# Patient Record
Sex: FEMALE | Race: WHITE | NOT HISPANIC OR LATINO | Employment: FULL TIME | URBAN - METROPOLITAN AREA
[De-identification: names, ages, dates, MRNs, and addresses within clinical notes are randomized per-mention and may not be internally consistent; named-entity substitution may affect disease eponyms.]

---

## 2023-05-15 ENCOUNTER — TELEPHONE (OUTPATIENT)
Dept: PRIMARY CARE | Facility: CLINIC | Age: 68
End: 2023-05-15

## 2023-07-31 ENCOUNTER — TELEPHONE (OUTPATIENT)
Dept: PRIMARY CARE | Facility: CLINIC | Age: 68
End: 2023-07-31

## 2023-07-31 DIAGNOSIS — Z11.59 SCREENING EXAMINATION FOR RUBELLA: Primary | ICD-10-CM

## 2023-08-01 ENCOUNTER — LAB (OUTPATIENT)
Dept: LAB | Facility: LAB | Age: 68
End: 2023-08-01
Payer: MEDICARE

## 2023-08-01 DIAGNOSIS — Z11.59 SCREENING EXAMINATION FOR RUBELLA: ICD-10-CM

## 2023-08-01 LAB
MUMPS IGG ANTIBODY: POSITIVE
RUBELLA VIRUS IGG AB: POSITIVE
RUBEOLA IGG ANTIBODY: POSITIVE

## 2023-08-01 PROCEDURE — 86735 MUMPS ANTIBODY: CPT

## 2023-08-01 PROCEDURE — 86762 RUBELLA ANTIBODY: CPT

## 2023-08-01 PROCEDURE — 36415 COLL VENOUS BLD VENIPUNCTURE: CPT

## 2023-08-01 PROCEDURE — 86765 RUBEOLA ANTIBODY: CPT

## 2024-05-20 ENCOUNTER — APPOINTMENT (OUTPATIENT)
Dept: RADIOLOGY | Facility: HOSPITAL | Age: 69
End: 2024-05-20
Payer: COMMERCIAL

## 2024-05-20 ENCOUNTER — HOSPITAL ENCOUNTER (EMERGENCY)
Facility: HOSPITAL | Age: 69
Discharge: HOME | End: 2024-05-20
Payer: COMMERCIAL

## 2024-05-20 VITALS
BODY MASS INDEX: 28.72 KG/M2 | WEIGHT: 157 LBS | RESPIRATION RATE: 16 BRPM | TEMPERATURE: 97.8 F | OXYGEN SATURATION: 98 % | SYSTOLIC BLOOD PRESSURE: 141 MMHG | HEART RATE: 74 BPM | DIASTOLIC BLOOD PRESSURE: 79 MMHG
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
## 2024-05-20 DIAGNOSIS — M25.561 ACUTE PAIN OF RIGHT KNEE: Primary | ICD-10-CM
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
## 2024-05-20 PROBLEM — M16.11 PRIMARY LOCALIZED OSTEOARTHRITIS OF RIGHT HIP: Status: ACTIVE | Noted: 2024-05-20

## 2024-05-20 PROBLEM — M54.12 CERVICAL RADICULOPATHY: Status: ACTIVE | Noted: 2024-05-20

## 2024-05-20 PROBLEM — E34.8 PINEAL GLAND CYST: Status: ACTIVE | Noted: 2024-05-20

## 2024-05-20 PROBLEM — E78.5 HYPERLIPIDEMIA: Status: ACTIVE | Noted: 2024-05-20

## 2024-05-20 PROBLEM — E55.9 VITAMIN D DEFICIENCY: Status: ACTIVE | Noted: 2024-05-20

## 2024-05-20 PROBLEM — R25.1 TREMOR: Status: ACTIVE | Noted: 2024-05-20

## 2024-05-20 PROBLEM — M85.80 OSTEOPENIA: Status: ACTIVE | Noted: 2024-05-20

## 2024-05-20 PROCEDURE — 99283 EMERGENCY DEPT VISIT LOW MDM: CPT

## 2024-05-20 PROCEDURE — 73564 X-RAY EXAM KNEE 4 OR MORE: CPT | Mod: RT

## 2024-05-20 PROCEDURE — 73564 X-RAY EXAM KNEE 4 OR MORE: CPT | Mod: RIGHT SIDE | Performed by: RADIOLOGY

## 2024-05-20 ASSESSMENT — PAIN SCALES - GENERAL: PAINLEVEL_OUTOF10: 5 - MODERATE PAIN

## 2024-05-20 ASSESSMENT — PAIN - FUNCTIONAL ASSESSMENT: PAIN_FUNCTIONAL_ASSESSMENT: 0-10

## 2024-05-20 ASSESSMENT — COLUMBIA-SUICIDE SEVERITY RATING SCALE - C-SSRS
2. HAVE YOU ACTUALLY HAD ANY THOUGHTS OF KILLING YOURSELF?: NO
6. HAVE YOU EVER DONE ANYTHING, STARTED TO DO ANYTHING, OR PREPARED TO DO ANYTHING TO END YOUR LIFE?: NO
1. IN THE PAST MONTH, HAVE YOU WISHED YOU WERE DEAD OR WISHED YOU COULD GO TO SLEEP AND NOT WAKE UP?: NO

## 2024-05-20 NOTE — DISCHARGE INSTRUCTIONS
Please continue RICE therapy: Rest, ice, compression and elevation.  Continue Tylenol and/or ibuprofen as needed for pain.  Follow-up with occupational health and/or orthopedics.  Return to ER for any new or worsening symptoms.     orthopedic injury clinic   [facility] sports medicine [place]  [address]  [address]  [place] [address]  [phone]  Open for walk-ins Monday through Friday 8:30 AM through 4 PM  open 10 AM to 2 PM Christmas eve and New Year's yury  Closed on [place] day and New Year's day

## 2024-05-20 NOTE — Clinical Note
Estrella Aguilar was seen and treated in our emergency department on 5/20/2024.  She may return to work on 05/22/2024.       If you have any questions or concerns, please don't hesitate to call.      Rosa Gonzalez PA-C

## 2024-05-20 NOTE — ED PROVIDER NOTES
Chief Complaint   Patient presents with    Knee Pain     HPI:   Estrella Aguilar is an 69 y.o. female with history of HLD, vitamin disorder that presents to the ED for evaluation of right knee pain.  Patient says she was at work on Friday.  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  One of them off the floor and did not put out a wet floor sign.  She had a young autistic patient who was running out of the room.  She ran after him and as soon as she did she fell on the wet floor and landed on her right knee.  She did not hit her head.  Denies headache, loss of consciousness, head pain, neck pain.  Localizes pain to the lateral aspect of her right knee.  Currently pain is 4/10.  When she walks pain becomes 2-3/10.  Has not taken any medication for pain today.  Took Aleve yesterday.  Endorses tingling but denies any numbness, loss of sensation, muscle weakness, wrist pain, neck pain, back pain.  Is not on anticoagulants.    Medications:  Soc HX: Denies substance use  Allergies   Allergen Reactions    Amoxicillin-Pot Clavulanate GI Upset     diarrhea    Sulfa (Sulfonamide Antibiotics) Hives   :  Past Medical History:   Diagnosis Date    Nondisplaced fracture of left ulna styloid process, initial encounter for closed fracture 11/04/2022    Closed nondisplaced fracture of styloid process of left ulna, initial encounter    Nondisplaced fracture of right ulna styloid process, subsequent encounter for closed fracture with routine healing 11/09/2022    Closed nondisplaced fracture of styloid process of right ulna with routine healing, subsequent encounter    Other displaced fracture of upper end of left humerus, subsequent encounter for fracture with routine healing 02/02/2022    Other closed displaced fracture of proximal end of left humerus with routine healing, subsequent encounter    Other extraarticular fracture of lower end of left radius, initial encounter for closed  fracture 11/04/2022    Other closed extra-articular fracture of distal end of left radius, initial encounter    Other fracture of upper and lower end of left fibula, initial encounter for closed fracture 02/02/2022    Fracture of fibula, distal, left, closed    Other specified joint disorders, right hip 02/02/2022    Femoroacetabular impingement of right hip    Pathological fracture, unspecified femur, initial encounter for fracture (Multi) 05/09/2014    Subchondral insufficiency fracture of femoral condyle    Stress fracture, unspecified femur, initial encounter for fracture 05/09/2014    Stress fracture of femur    Unspecified fracture of the lower end of right radius, initial encounter for closed fracture 11/09/2022    Distal radius fracture, right    Unspecified perforation of tympanic membrane, left ear 10/05/2022    Tympanic membrane perforation, left     Past Surgical History:   Procedure Laterality Date    BREAST SURGERY  05/06/2014    Breast Surgery Reduction Procedure    OTHER SURGICAL HISTORY  05/06/2014    Tympanic Membrane Repair - Left Ear    OTHER SURGICAL HISTORY  05/06/2014    Craniotomy Supratentorial Excision Of Cyst    TOTAL ABDOMINAL HYSTERECTOMY  05/06/2014    Total Abdominal Hysterectomy     No family history on file.     Physical Exam  Vitals and nursing note reviewed.   Constitutional:       General: She is not in acute distress.     Appearance: Normal appearance. She is not ill-appearing or toxic-appearing.      Comments: Pleasant   HENT:      Head: Normocephalic and atraumatic.      Comments: No signs basilar skull fracture     Right Ear: External ear normal.      Left Ear: External ear normal.   Eyes:      Pupils: Pupils are equal, round, and reactive to light.   Cardiovascular:      Rate and Rhythm: Normal rate and regular rhythm.      Pulses: Normal pulses.      Heart sounds: Normal heart sounds.   Pulmonary:      Effort: Pulmonary effort is normal.      Breath sounds: Normal breath  sounds.   Musculoskeletal:         General: Normal range of motion.      Comments: RLE: No point tenderness of the hip.  Normal ROM of the knee and ankle.  Tenderness with palpation along the lateral aspect of the knee without warmth, erythema, ecchymosis nor edema.  No bony crepitus.  No varus or valgus instability.  Negative anterior drawer.  2+ DP and PT pulse.  5/5 strength.   Skin:     General: Skin is warm and dry.      Capillary Refill: Capillary refill takes less than 2 seconds.   Neurological:      Mental Status: She is alert.      Cranial Nerves: No cranial nerve deficit.      Sensory: No sensory deficit.      Deep Tendon Reflexes: Reflexes normal.     VS: As documented in the triage note and EMR flowsheet from this visit were reviewed.    External Records Reviewed: I reviewed recent and relevant outside records including: Seen by PCP 5/10/2024 for MMR vaccine.  Tetanus up-to-date.      Medical Decision Making:   ED Course as of 05/20/24 1321   Mon May 20, 2024   0743 Vitals Reviewed: Hypertensive. Not tachycardic nor tachypneic. No hypoxia.   [KA]   0812 Patient is 69-year-old female who presents to the ED for evaluation following a fall that occurred 4 days ago.  On exam she has tenderness with palpation of the lateral aspect of the right knee without any erythema, ecchymosis, edema, warmth.  She has normal range of motion without bony crepitus.  No obvious deformity.  Will obtain x-ray imaging.  No concern for septic arthritis that would necessitate lab work.  Patient does not want any medication for pain. [KA]   0838 I personally viewed x-ray imaging and see no evidence of fracture nor dislocation.  Radiology concurs. [KA]   0921 Patient given copy of completed FROI form. [KA]   0922 Advised RICE therapy, NSAIDs and Tylenol.  Follow-up with orthopedics and/or occupational health.  Return to ER for any new or worsening symptoms.  Work note provided. [KA]      ED Course User Index  [KA] Rosa Gonzalez,  SANDI         Diagnoses as of 05/20/24 1321   Acute pain of right knee   Fall due to wet surface, initial encounter   Work related injury      Escalation of Care: Appropriate for outpatient management     Counseling: Spoke with the patient and discussed today´s findings, in addition to providing specific details for the plan of care and expected course.  Patient was given the opportunity to ask questions.    Discussed return precautions and importance of follow-up.  Advised to follow-up with occupational health and Ortho.  Advised to return to the ED for changing or worsening symptoms, new symptoms, complaint specific precautions, and precautions listed on the discharge paperwork.  Educated on the common potential side effects of medications prescribed.    I advised the patient that the emergency evaluation and treatment provided today doesn't end their need for medical care. It is very important that they follow-up with their primary care provider or other specialist as instructed.    The plan of care was mutually agreed upon with the patient. The patient and/or family were given the opportunity to ask questions. All questions asked today in the ED were answered to the best of my ability with today's information.    I specifically advised the patient to return to the ED for changing or worsening symptoms, worrisome new symptoms, or for any complaint specific precautions listed on the discharge paperwork.    This patient was cared for in the setting of nationwide stress on resources and staffing.    This report was transcribed using voice recognition software.  Every effort was made to ensure accuracy, however, inadvertently computerized transcription errors may be present.       Rosa Gonzalez PA-C  05/20/24 1321

## 2024-05-20 NOTE — ED TRIAGE NOTES
PT TO ED WITH C/O RIGHT KNEE PAIN. PT SLIPPED ON WET FLOOR AT WORK AND FELL ONTO RIGHT KNEE. DENIES HITTING HEAD, LOC. PT AMBULATED TO TRIAGE. PT ENDORSES MILD NUMBNESS/TINGLING TO SIDE OF KNEE

## 2024-05-21 ENCOUNTER — APPOINTMENT (OUTPATIENT)
Dept: PRIMARY CARE | Facility: CLINIC | Age: 69
End: 2024-05-21
Payer: MEDICARE

## 2024-06-05 ENCOUNTER — APPOINTMENT (OUTPATIENT)
Dept: PRIMARY CARE | Facility: CLINIC | Age: 69
End: 2024-06-05
Payer: MEDICARE

## 2024-08-20 ENCOUNTER — APPOINTMENT (OUTPATIENT)
Dept: ORTHOPEDIC SURGERY | Facility: CLINIC | Age: 69
End: 2024-08-20
Payer: MEDICARE

## 2024-09-03 ENCOUNTER — HOSPITAL ENCOUNTER (OUTPATIENT)
Dept: RADIOLOGY | Facility: CLINIC | Age: 69
Discharge: HOME | End: 2024-09-03
Payer: MEDICARE

## 2024-09-03 ENCOUNTER — OFFICE VISIT (OUTPATIENT)
Dept: ORTHOPEDIC SURGERY | Facility: CLINIC | Age: 69
End: 2024-09-03
Payer: MEDICARE

## 2024-09-03 DIAGNOSIS — M16.11 PRIMARY LOCALIZED OSTEOARTHRITIS OF RIGHT HIP: Primary | ICD-10-CM

## 2024-09-03 DIAGNOSIS — M16.11 PRIMARY LOCALIZED OSTEOARTHRITIS OF RIGHT HIP: ICD-10-CM

## 2024-09-03 PROCEDURE — 99213 OFFICE O/P EST LOW 20 MIN: CPT

## 2024-09-03 PROCEDURE — 73502 X-RAY EXAM HIP UNI 2-3 VIEWS: CPT | Mod: RIGHT SIDE | Performed by: RADIOLOGY

## 2024-09-03 PROCEDURE — 73502 X-RAY EXAM HIP UNI 2-3 VIEWS: CPT | Mod: RT

## 2024-09-03 NOTE — LETTER
September 3, 2024     Patient: Estrella Aguilar   YOB: 1955   Date of Visit: 9/3/2024       To Whom It May Concern:    It is my medical opinion that Estrella Aguilar may return to light duty immediately with the following restrictions: cannot bend .    If you have any questions or concerns, please don't hesitate to call.         Sincerely,        Shelley Garrison PA-C    CC: No Recipients

## 2024-09-03 NOTE — PROGRESS NOTES
ROBERTO Bunch, PAMildredC  Division of Adult Reconstruction  Phone: [phone]  Fax: [phone]    PRIMARY CARE PHYSICIAN: Suzie Jett MD  REFERRING PROVIDER: No referring provider defined for this encounter.     VIRAJ Aguilar is a pleasant 69 y.o. year-old female who is seen today for follow up evaluation of right hip pain and known right sided OA. She was last seen in office 8/8/23 where she was pleased with the overall state of her hip. She was having mild pain and it was not impacting her quality of life. Today, she presents with occasional increased stiffness and pain of the right hip. She states the pain fluctuates day to day with some days having no pain at all. Overall the pain is manageable with OTC medications and she is able to perform her daily activities without any issue. They do not associate with a traumatic injury. The patient states that the pain is located in the lateral, over greater trochanter, groin. The patient is now doing martial arts. The patient's pain and symptoms are worse with activity or getting up from the ground. The patient has tried activity modification, over the counter medications, and weight loss with sufficient relief of symptoms. The patient continues to be happy with her physical abilities and does not feel her right hip OA is impacting her quality of life. The patient denies any numbness or tingling of the side: right lower extremity.    Functional status: occasionally limited  Instability: No  Symptoms impacting sleep: No  Impacting quality of life: No  Pain level: 4  Back pain: No  Radicular symptoms: kwside: No    Review of systems  There has been no interval change in this patient's past medical, surgical, medications, allergies, family history or social history since the most recent visit to a provider within our department. Adult patient history sheet was filled out by the patient today in clinic and will be scanned into the EMR. I personally  reviewed this form which will be scanned into the EMR. 14 point review of systems was performed, reviewed, and negative except for pertinent positives documented in the history of present illness.       Review of Systems  Past Medical History:   Diagnosis Date    Nondisplaced fracture of left ulna styloid process, initial encounter for closed fracture 11/04/2022    Closed nondisplaced fracture of styloid process of left ulna, initial encounter    Nondisplaced fracture of right ulna styloid process, subsequent encounter for closed fracture with routine healing 11/09/2022    Closed nondisplaced fracture of styloid process of right ulna with routine healing, subsequent encounter    Other displaced fracture of upper end of left humerus, subsequent encounter for fracture with routine healing 02/02/2022    Other closed displaced fracture of proximal end of left humerus with routine healing, subsequent encounter    Other extraarticular fracture of lower end of left radius, initial encounter for closed fracture 11/04/2022    Other closed extra-articular fracture of distal end of left radius, initial encounter    Other fracture of upper and lower end of left fibula, initial encounter for closed fracture 02/02/2022    Fracture of fibula, distal, left, closed    Other specified joint disorders, right hip 02/02/2022    Femoroacetabular impingement of right hip    Pathological fracture, unspecified femur, initial encounter for fracture (Multi) 05/09/2014    Subchondral insufficiency fracture of femoral condyle    Stress fracture, unspecified femur, initial encounter for fracture 05/09/2014    Stress fracture of femur    Unspecified fracture of the lower end of right radius, initial encounter for closed fracture 11/09/2022    Distal radius fracture, right    Unspecified perforation of tympanic membrane, left ear 10/05/2022    Tympanic membrane perforation, left     Patient Active Problem List   Diagnosis    Brachial neuritis or  radiculitis    Cervical radiculopathy    Cervical spondylosis without myelopathy    Cervicalgia    Hyperlipidemia    Hypertrophy of breast    Osteopenia    Pineal gland cyst    Primary localized osteoarthritis of right hip    Tremor    Vitamin D deficiency     Medication Documentation Review Audit       Reviewed by Rosa Gonzalez PA-C (Physician Assistant) on 05/20/24 at 0743      Medication Order Taking? Sig Documenting Provider Last Dose Status            No Medications to Display                                 Allergies   Allergen Reactions    Amoxicillin-Pot Clavulanate GI Upset     diarrhea    Sulfa (Sulfonamide Antibiotics) Hives     Social History     Socioeconomic History    Marital status:      Spouse name: Not on file    Number of children: Not on file    Years of education: Not on file    Highest education level: Not on file   Occupational History    Not on file   Tobacco Use    Smoking status: Not on file    Smokeless tobacco: Not on file   Substance and Sexual Activity    Alcohol use: Not on file    Drug use: Not on file    Sexual activity: Not on file   Other Topics Concern    Not on file   Social History Narrative    Not on file     Social Determinants of Health     Financial Resource Strain: Not on file   Food Insecurity: Not on file   Transportation Needs: Not on file   Physical Activity: Not on file   Stress: Not on file   Social Connections: Not on file   Intimate Partner Violence: Not on file   Housing Stability: Not on file     Past Surgical History:   Procedure Laterality Date    BREAST SURGERY  05/06/2014    Breast Surgery Reduction Procedure    OTHER SURGICAL HISTORY  05/06/2014    Tympanic Membrane Repair - Left Ear    OTHER SURGICAL HISTORY  05/06/2014    Craniotomy Supratentorial Excision Of Cyst    TOTAL ABDOMINAL HYSTERECTOMY  05/06/2014    Total Abdominal Hysterectomy       Physical Exam  side: right Hip:  General: Well-appearing female in no acute distress.  Awake, alert  and oriented.  Pleasant and cooperative.  Respiratory: Non-labored breathing  Mood: Euthymic   Gait: Mild antalgia  Assistive Device: no device  Skin: warm, dry and intact without lesions    Range of Motion affected side: right hip:  Flexion: 100  Extension: 0  Internal rotation: 10  External rotation: 25  Abduction: 25  Hip Flexor Strength: 5/5   Abductor Strength: 5/5  Adductor Strength: 5/5  Tenderness: Lateral and Groin  Sensation: Intact to light touch distally  Motor function: Able to fire TA, EHL, G/S  Pulses: Palpable DP pulse    Range of Motion unaffected side: left hip:  Flexion:  120  Extension: 0  Internal rotation:  20  External rotation:  40  abduction:  35  Hip Flexor Strength: 5/5   Abductor Strength: 5/5  Adductor Strength: 5/5  Tenderness: None  Sensation: Intact to light touch distally  Motor function: Able to fire TA, EHL, G/S  Pulses: Palpable DP pulse      Imaging:   3 view Hip and Pelvis were independently reviewed and interpreted in clinic today. The findings were reviewed with the patient. There are Moderate to severe degenerative changes of the right hip with associated joint space narrowing, subchondral sclerosis, and osteophyte formation. No evidence of fracture, AVN, dislocation, osteomyelitis.    Impression/Plan:  Estrella Aguilar and I discussed the etiology of symptoms.  We discussed in detail a treatment plan that she is comfortable with.    Moderate to severe Osteoarthritis side: right Hip. We reviewed an evidence-based approach to osteoarthritis of the hip.    I advised that the patient that they can manage their pain symptomatically, with 3-5 day courses of nonsteroidal anti-inflammatories discussed their risks and need for regular monitoring for side effects of these medications. I suggested activity modification as needed when there is a a flare up. I explained to them that the best interventions she can take to perhaps slow the progression of the degenerative changes in the  long-term are maintaining a healthy weight and avoiding joint loading activities and perform low impact exercise such as cycling, walking, and swimming. The use of a walking stick, cane or other assistive device can help as well. Corticosteroids play a role to temporarily aid in pain relief as an additional option and discussed associated risks. A referral to sports medicine will need to be made, if the patient elects to undergo an ultrasound guided hip injection. Should these measures fail, the most invasive option would be joint replacement surgery. The patient should try and delay joint replacement surgery for as long as possible. If the patients' joint problem affects their quality of life and cause significant restrictions of their activities, they may want to consider joint replacement surgery. I briefly covered the risks, benefits and expected recovery after total joint arthroplasty.    Estrella Aguilar may one day benefit from an elective total joint replacement however has not yet exhausted other treatment options. I do not see a clear indication to move forward with arthroplasty. I have recommended the following and the patient is in agreement with the plan.  1. Continue OTC medications  2.  Continue to stay active and avoid high impact exercises    All of the patient's questions were answered and she was comfortable with this plan of care.  Estrella Aguilar was encouraged to call if any problems, questions or concerns arise.     Follow-up 1 year with xrays     ROBERTO Pritchard PA-C  Orthopedic Physician [facility]  Division of Adult Reconstruction  Department of Orthopaedics  [place] [address]  [employer] messaging preferred

## 2024-10-07 ENCOUNTER — LAB (OUTPATIENT)
Dept: LAB | Facility: LAB | Age: 69
End: 2024-10-07
Payer: MEDICARE

## 2024-10-07 ENCOUNTER — APPOINTMENT (OUTPATIENT)
Dept: PRIMARY CARE | Facility: CLINIC | Age: 69
End: 2024-10-07

## 2024-10-07 VITALS
SYSTOLIC BLOOD PRESSURE: 110 MMHG | BODY MASS INDEX: 28.37 KG/M2 | HEART RATE: 86 BPM | DIASTOLIC BLOOD PRESSURE: 69 MMHG | HEIGHT: 62 IN | WEIGHT: 154.2 LBS

## 2024-10-07 DIAGNOSIS — E78.2 MIXED HYPERLIPIDEMIA: ICD-10-CM

## 2024-10-07 DIAGNOSIS — Z12.31 ENCOUNTER FOR SCREENING MAMMOGRAM FOR MALIGNANT NEOPLASM OF BREAST: ICD-10-CM

## 2024-10-07 DIAGNOSIS — M85.80 OSTEOPENIA, UNSPECIFIED LOCATION: ICD-10-CM

## 2024-10-07 DIAGNOSIS — E55.9 VITAMIN D DEFICIENCY: ICD-10-CM

## 2024-10-07 DIAGNOSIS — M16.11 PRIMARY LOCALIZED OSTEOARTHRITIS OF RIGHT HIP: ICD-10-CM

## 2024-10-07 DIAGNOSIS — Z00.00 ROUTINE GENERAL MEDICAL EXAMINATION AT HEALTH CARE FACILITY: Primary | ICD-10-CM

## 2024-10-07 PROBLEM — M84.353A STRESS FRACTURE OF FEMUR: Status: RESOLVED | Noted: 2024-10-07 | Resolved: 2024-10-07

## 2024-10-07 PROBLEM — G47.00 INSOMNIA: Status: RESOLVED | Noted: 2024-10-07 | Resolved: 2024-10-07

## 2024-10-07 LAB
BASOPHILS # BLD AUTO: 0.05 X10*3/UL (ref 0–0.1)
BASOPHILS NFR BLD AUTO: 0.7 %
EOSINOPHIL # BLD AUTO: 0.21 X10*3/UL (ref 0–0.7)
EOSINOPHIL NFR BLD AUTO: 2.8 %
ERYTHROCYTE [DISTWIDTH] IN BLOOD BY AUTOMATED COUNT: 12.8 % (ref 11.5–14.5)
EST. AVERAGE GLUCOSE BLD GHB EST-MCNC: 111 MG/DL
HBA1C MFR BLD: 5.5 %
HCT VFR BLD AUTO: 42 % (ref 36–46)
HGB BLD-MCNC: 13.2 G/DL (ref 12–16)
IMM GRANULOCYTES # BLD AUTO: 0.03 X10*3/UL (ref 0–0.7)
IMM GRANULOCYTES NFR BLD AUTO: 0.4 % (ref 0–0.9)
LYMPHOCYTES # BLD AUTO: 1.68 X10*3/UL (ref 1.2–4.8)
LYMPHOCYTES NFR BLD AUTO: 22.4 %
MCH RBC QN AUTO: 27.9 PG (ref 26–34)
MCHC RBC AUTO-ENTMCNC: 31.4 G/DL (ref 32–36)
MCV RBC AUTO: 89 FL (ref 80–100)
MONOCYTES # BLD AUTO: 0.6 X10*3/UL (ref 0.1–1)
MONOCYTES NFR BLD AUTO: 8 %
NEUTROPHILS # BLD AUTO: 4.94 X10*3/UL (ref 1.2–7.7)
NEUTROPHILS NFR BLD AUTO: 65.7 %
NRBC BLD-RTO: 0 /100 WBCS (ref 0–0)
PLATELET # BLD AUTO: 276 X10*3/UL (ref 150–450)
RBC # BLD AUTO: 4.73 X10*6/UL (ref 4–5.2)
WBC # BLD AUTO: 7.5 X10*3/UL (ref 4.4–11.3)

## 2024-10-07 PROCEDURE — 36415 COLL VENOUS BLD VENIPUNCTURE: CPT

## 2024-10-07 PROCEDURE — 1123F ACP DISCUSS/DSCN MKR DOCD: CPT | Performed by: INTERNAL MEDICINE

## 2024-10-07 PROCEDURE — 1170F FXNL STATUS ASSESSED: CPT | Performed by: INTERNAL MEDICINE

## 2024-10-07 PROCEDURE — 93000 ELECTROCARDIOGRAM COMPLETE: CPT | Performed by: INTERNAL MEDICINE

## 2024-10-07 PROCEDURE — 99214 OFFICE O/P EST MOD 30 MIN: CPT | Performed by: INTERNAL MEDICINE

## 2024-10-07 PROCEDURE — 1160F RVW MEDS BY RX/DR IN RCRD: CPT | Performed by: INTERNAL MEDICINE

## 2024-10-07 PROCEDURE — 1036F TOBACCO NON-USER: CPT | Performed by: INTERNAL MEDICINE

## 2024-10-07 PROCEDURE — G0439 PPPS, SUBSEQ VISIT: HCPCS | Performed by: INTERNAL MEDICINE

## 2024-10-07 PROCEDURE — 1159F MED LIST DOCD IN RCRD: CPT | Performed by: INTERNAL MEDICINE

## 2024-10-07 PROCEDURE — 99397 PER PM REEVAL EST PAT 65+ YR: CPT | Performed by: INTERNAL MEDICINE

## 2024-10-07 PROCEDURE — 3008F BODY MASS INDEX DOCD: CPT | Performed by: INTERNAL MEDICINE

## 2024-10-07 ASSESSMENT — ENCOUNTER SYMPTOMS
DYSPHORIC MOOD: 0
EYE ITCHING: 0
DIARRHEA: 0
DYSURIA: 0
ARTHRALGIAS: 1
SINUS PRESSURE: 0
LIGHT-HEADEDNESS: 0
ACTIVITY CHANGE: 0
ABDOMINAL DISTENTION: 0
ADENOPATHY: 0
MYALGIAS: 0
NERVOUS/ANXIOUS: 0
EYE DISCHARGE: 0
PALPITATIONS: 0
HYPERACTIVE: 0
FREQUENCY: 0
SINUS PAIN: 0
VOMITING: 0
VOICE CHANGE: 0
CHEST TIGHTNESS: 0
COUGH: 0
WHEEZING: 0
BACK PAIN: 0
DEPRESSION: 0
HEMATURIA: 0
BRUISES/BLEEDS EASILY: 0
RHINORRHEA: 0
SLEEP DISTURBANCE: 0
CONSTIPATION: 0
SORE THROAT: 0
UNEXPECTED WEIGHT CHANGE: 0
DIZZINESS: 0
NAUSEA: 0
WEAKNESS: 0
DECREASED CONCENTRATION: 0
OCCASIONAL FEELINGS OF UNSTEADINESS: 0
HEADACHES: 0
NUMBNESS: 1
APPETITE CHANGE: 0
NECK STIFFNESS: 0
NECK PAIN: 0
ABDOMINAL PAIN: 0
COLOR CHANGE: 0
LOSS OF SENSATION IN FEET: 1

## 2024-10-07 ASSESSMENT — ACTIVITIES OF DAILY LIVING (ADL)
TAKING_MEDICATION: INDEPENDENT
DOING_HOUSEWORK: INDEPENDENT
GROCERY_SHOPPING: INDEPENDENT
BATHING: INDEPENDENT
DRESSING: INDEPENDENT
MANAGING_FINANCES: INDEPENDENT

## 2024-10-07 NOTE — PROGRESS NOTES
Subjective   Reason for Visit: Estrella Aguilar is an 69 y.o. female patient comes in today for comprehensive physical and follow-up of medical conditions in conjunction with annual wellness visit    Ms. Aguilar comes in today for comprehensive physical and follow-up of medical conditions in conjunction with annual wellness visit, dictated in a separate note.  Please refer to that note for details on healthcare maintenance and screening studies.    Ms. Aguilar comes in today for comprehensive physical as well as a follow-up of medical conditions.  She was last seen about 2 years ago.  She is feeling quite well in general.  She takes no chronic prescription medications, never having started on the cholesterol-lowering therapy that was recommended.  She is amenable to doing so if needed depending on lab studies upcoming.  She tries to stay active in her care home.  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  She exercises regularly.  She has had some tingling and subjective numbness in her toes and bottom of her feet that she would like to have further evaluated with some lab studies.  She denies any headaches, dizziness, chest pain or palpitations, shortness of breath nor cough, nausea, vomiting, nor changes in bowel nor bladder habits.  She does redd arthritis in her right hip and follows with orthopedics for this.  This is manageable.  She tries to keep up with routine healthcare maintenance studies, plans on proceeding with her mammogram and any updated vaccines that are necessary.  She did have her flu shot and COVID boosters recently.  Again, she feels well in general, denying any problems or concerns at this time otherwise.        Patient Care Team:  Suzie Jett MD as PCP - General     Review of Systems   Constitutional:  Negative for activity change, appetite change and unexpected weight change.   HENT:  Negative for congestion, ear pain, postnasal drip, rhinorrhea, sinus pressure, sinus pain,  "sneezing, sore throat, tinnitus and voice change.    Eyes:  Negative for discharge, itching and visual disturbance.   Respiratory:  Negative for cough, chest tightness and wheezing.    Cardiovascular:  Negative for chest pain, palpitations and leg swelling.   Gastrointestinal:  Negative for abdominal distention, abdominal pain, constipation, diarrhea, nausea and vomiting.   Endocrine: Negative for cold intolerance, heat intolerance and polyuria.   Genitourinary:  Negative for dysuria, frequency, hematuria, urgency, vaginal bleeding and vaginal discharge.   Musculoskeletal:  Positive for arthralgias and gait problem. Negative for back pain, myalgias, neck pain and neck stiffness.   Skin:  Negative for color change, pallor and rash.   Allergic/Immunologic: Negative for environmental allergies, food allergies and immunocompromised state.   Neurological:  Positive for numbness. Negative for dizziness, syncope, weakness, light-headedness and headaches.   Hematological:  Negative for adenopathy. Does not bruise/bleed easily.   Psychiatric/Behavioral:  Negative for behavioral problems, decreased concentration, dysphoric mood and sleep disturbance. The patient is not nervous/anxious and is not hyperactive.        Objective   Vitals:  /69   Pulse 86   Ht 1.575 m (5' 2\")   Wt 69.9 kg (154 lb 3.2 oz)   BMI 28.20 kg/m²       Physical Exam  Constitutional:       General: She is not in acute distress.     Appearance: Normal appearance. She is well-developed. She is not ill-appearing.   HENT:      Head: Normocephalic.      Right Ear: Tympanic membrane, ear canal and external ear normal.      Left Ear: Tympanic membrane, ear canal and external ear normal.      Nose: Nose normal.      Mouth/Throat:      Mouth: Mucous membranes are moist.      Pharynx: Oropharynx is clear. No oropharyngeal exudate or posterior oropharyngeal erythema.   Eyes:      General: Lids are normal. No scleral icterus.     Extraocular Movements: " Extraocular movements intact.      Conjunctiva/sclera: Conjunctivae normal.      Pupils: Pupils are equal, round, and reactive to light.   Neck:      Vascular: No carotid bruit.   Cardiovascular:      Rate and Rhythm: Normal rate and regular rhythm.      Pulses: Normal pulses.      Heart sounds: No murmur heard.     No gallop.   Pulmonary:      Effort: Pulmonary effort is normal. No respiratory distress.      Breath sounds: No wheezing, rhonchi or rales.   Chest:   Breasts:     Right: No mass.      Left: No mass.      Comments: S/p breast reduction, no dominant masses, more dense scarring left lateral breast  Abdominal:      General: Bowel sounds are normal. There is no distension.      Palpations: Abdomen is soft. There is no mass.      Tenderness: There is no abdominal tenderness. There is no guarding or rebound.   Genitourinary:     Comments: Deferred, still UTD December 2022  Musculoskeletal:         General: No swelling or signs of injury.      Cervical back: Normal range of motion and neck supple. No tenderness.      Right lower leg: No edema.      Left lower leg: No edema.   Lymphadenopathy:      Cervical: No cervical adenopathy.      Upper Body:      Right upper body: No supraclavicular or axillary adenopathy.      Left upper body: No supraclavicular or axillary adenopathy.   Skin:     General: Skin is warm and dry.      Coloration: Skin is not pale.      Findings: No bruising or rash.   Neurological:      General: No focal deficit present.      Mental Status: She is alert and oriented to person, place, and time.      Cranial Nerves: No cranial nerve deficit.      Motor: No weakness.      Coordination: Coordination normal.      Gait: Gait normal.   Psychiatric:         Mood and Affect: Mood normal.         Behavior: Behavior normal.         Judgment: Judgment normal.         Assessment & Plan         Full age-appropriate comprehensive physical exam and health care maintenance performed and discussed today.   Routine safety and preventative measures discussed including self breast exam, seatbelt use, no drinking and driving, no texting and driving, abstinence or cessation of tobacco use, routine dental and vision exams, healthy diet and regular exercise.    We will update comprehensive labs and follow-up on results once available.  Due for mammogram.  Order placed.  EKG as above  Colonoscopy UTD 2023, repeat 3 years  DEXA up-to-date from 2023, excellent results, repeat 3-5 years.  Has received flu shot and updated COVID booster.  Has received RSV vaccine.  Could consider Prevnar 20 in the next few years, has had Pneumovax.  Tetanus up-to-date from 2018.  Has completed shingles vaccine series.    In addition to the above-mentioned healthcare maintenance and screening studies, the following were discussed and assessed in detail today:  1.  Hyperlipidemia: Update comprehensive labs.  She is not opposed to starting on statin therapy if needed based on lab results.  Follow-up with results once available, could consider CT calcium scoring scan as well.  2.  Osteoarthritis: Follows with orthopedics.  Manageable currently.  3.  Vitamin D deficiency with borderline osteopenia: DEXA up-to-date and excellent.  Consider repeat 3 to 5 years.  Update vitamin D.  Continue weightbearing exercise.    Happy to see her back essentially annually unless starting new medications.  She is to contact us with any questions.

## 2024-10-07 NOTE — PATIENT INSTRUCTIONS
It was a pleasure seeing you back in the office today.  We will follow up on all comprehensive blood work once results are available and make any recommendations based on these results as may be indicated.  Please continue with routine gynecologic exams and annual mammograms.  Colonoscopy and bone density scan are both up-to-date from 2023, plan to repeat in about 3 years.  Thank you for keeping up with routine vaccines.  If you have any questions, please contact us.  Otherwise, we are happy to see you back annually for your wellness visits.

## 2024-10-08 ENCOUNTER — APPOINTMENT (OUTPATIENT)
Dept: PRIMARY CARE | Facility: CLINIC | Age: 69
End: 2024-10-08
Payer: MEDICARE

## 2024-10-08 LAB
25(OH)D3 SERPL-MCNC: 40 NG/ML (ref 30–100)
ALBUMIN SERPL BCP-MCNC: 3.8 G/DL (ref 3.4–5)
ALP SERPL-CCNC: 69 U/L (ref 33–136)
ALT SERPL W P-5'-P-CCNC: 14 U/L (ref 7–45)
ANION GAP SERPL CALC-SCNC: 13 MMOL/L (ref 10–20)
AST SERPL W P-5'-P-CCNC: 13 U/L (ref 9–39)
BILIRUB SERPL-MCNC: 0.5 MG/DL (ref 0–1.2)
BUN SERPL-MCNC: 14 MG/DL (ref 6–23)
CALCIUM SERPL-MCNC: 9.1 MG/DL (ref 8.6–10.6)
CHLORIDE SERPL-SCNC: 107 MMOL/L (ref 98–107)
CHOLEST SERPL-MCNC: 274 MG/DL (ref 0–199)
CHOLESTEROL/HDL RATIO: 4.5
CO2 SERPL-SCNC: 26 MMOL/L (ref 21–32)
CREAT SERPL-MCNC: 1.11 MG/DL (ref 0.5–1.05)
CREAT UR-MCNC: 65.6 MG/DL (ref 20–320)
EGFRCR SERPLBLD CKD-EPI 2021: 54 ML/MIN/1.73M*2
GLUCOSE SERPL-MCNC: 89 MG/DL (ref 74–99)
HDLC SERPL-MCNC: 61.5 MG/DL
IRON SATN MFR SERPL: 30 % (ref 25–45)
IRON SERPL-MCNC: 89 UG/DL (ref 35–150)
LDLC SERPL CALC-MCNC: 169 MG/DL
MICROALBUMIN UR-MCNC: <7 MG/L
MICROALBUMIN/CREAT UR: NORMAL MG/G{CREAT}
NON HDL CHOLESTEROL: 213 MG/DL (ref 0–149)
POTASSIUM SERPL-SCNC: 4.3 MMOL/L (ref 3.5–5.3)
PROT SERPL-MCNC: 6.4 G/DL (ref 6.4–8.2)
SODIUM SERPL-SCNC: 142 MMOL/L (ref 136–145)
TIBC SERPL-MCNC: 300 UG/DL (ref 240–445)
TRIGL SERPL-MCNC: 220 MG/DL (ref 0–149)
TSH SERPL-ACNC: 0.95 MIU/L (ref 0.44–3.98)
UIBC SERPL-MCNC: 211 UG/DL (ref 110–370)
VIT B12 SERPL-MCNC: 518 PG/ML (ref 211–911)
VLDL: 44 MG/DL (ref 0–40)

## 2024-10-09 ENCOUNTER — HOSPITAL ENCOUNTER (OUTPATIENT)
Dept: RADIOLOGY | Facility: CLINIC | Age: 69
Discharge: HOME | End: 2024-10-09
Payer: MEDICARE

## 2024-10-09 VITALS — HEIGHT: 62 IN | WEIGHT: 154.1 LBS | BODY MASS INDEX: 28.36 KG/M2

## 2024-10-09 DIAGNOSIS — Z12.31 ENCOUNTER FOR SCREENING MAMMOGRAM FOR MALIGNANT NEOPLASM OF BREAST: ICD-10-CM

## 2024-10-09 PROCEDURE — 77063 BREAST TOMOSYNTHESIS BI: CPT | Performed by: RADIOLOGY

## 2024-10-09 PROCEDURE — 77067 SCR MAMMO BI INCL CAD: CPT

## 2024-10-09 PROCEDURE — 77067 SCR MAMMO BI INCL CAD: CPT | Performed by: RADIOLOGY

## 2024-10-16 DIAGNOSIS — E78.2 MIXED HYPERLIPIDEMIA: Primary | ICD-10-CM

## 2024-10-20 DIAGNOSIS — E78.2 MIXED HYPERLIPIDEMIA: Primary | ICD-10-CM

## 2024-10-20 RX ORDER — ROSUVASTATIN CALCIUM 20 MG/1
20 TABLET, COATED ORAL DAILY
Qty: 90 TABLET | Refills: 0 | Status: SHIPPED | OUTPATIENT
Start: 2024-10-20 | End: 2025-01-18

## 2024-11-21 ENCOUNTER — APPOINTMENT (OUTPATIENT)
Dept: PRIMARY CARE | Facility: CLINIC | Age: 69
End: 2024-11-21
Payer: MEDICARE

## 2024-12-10 ENCOUNTER — APPOINTMENT (OUTPATIENT)
Dept: ORTHOPEDIC SURGERY | Facility: CLINIC | Age: 69
End: 2024-12-10
Payer: MEDICARE

## 2024-12-12 ENCOUNTER — APPOINTMENT (OUTPATIENT)
Dept: PRIMARY CARE | Facility: CLINIC | Age: 69
End: 2024-12-12
Payer: MEDICARE

## 2024-12-20 ENCOUNTER — APPOINTMENT (OUTPATIENT)
Dept: PRIMARY CARE | Facility: CLINIC | Age: 69
End: 2024-12-20
Payer: MEDICARE

## 2025-02-06 ENCOUNTER — APPOINTMENT (OUTPATIENT)
Dept: PRIMARY CARE | Facility: CLINIC | Age: 70
End: 2025-02-06
Payer: MEDICARE

## 2025-02-06 VITALS
WEIGHT: 153 LBS | DIASTOLIC BLOOD PRESSURE: 84 MMHG | BODY MASS INDEX: 27.98 KG/M2 | SYSTOLIC BLOOD PRESSURE: 121 MMHG | HEART RATE: 84 BPM

## 2025-02-06 DIAGNOSIS — G47.9 SLEEP DISTURBANCES: ICD-10-CM

## 2025-02-06 DIAGNOSIS — E78.2 MIXED HYPERLIPIDEMIA: Primary | ICD-10-CM

## 2025-02-06 DIAGNOSIS — F43.9 SITUATIONAL STRESS: ICD-10-CM

## 2025-02-06 PROCEDURE — 1036F TOBACCO NON-USER: CPT | Performed by: INTERNAL MEDICINE

## 2025-02-06 PROCEDURE — 99214 OFFICE O/P EST MOD 30 MIN: CPT | Performed by: INTERNAL MEDICINE

## 2025-02-06 PROCEDURE — 1123F ACP DISCUSS/DSCN MKR DOCD: CPT | Performed by: INTERNAL MEDICINE

## 2025-02-06 PROCEDURE — 1160F RVW MEDS BY RX/DR IN RCRD: CPT | Performed by: INTERNAL MEDICINE

## 2025-02-06 PROCEDURE — 1159F MED LIST DOCD IN RCRD: CPT | Performed by: INTERNAL MEDICINE

## 2025-02-06 RX ORDER — TRAZODONE HYDROCHLORIDE 50 MG/1
50 TABLET ORAL NIGHTLY PRN
Qty: 30 TABLET | Refills: 2 | Status: SHIPPED | OUTPATIENT
Start: 2025-02-06 | End: 2026-02-06

## 2025-02-06 RX ORDER — ROSUVASTATIN CALCIUM 20 MG/1
20 TABLET, COATED ORAL DAILY
Qty: 90 TABLET | Refills: 0 | Status: SHIPPED | OUTPATIENT
Start: 2025-02-06 | End: 2025-05-07

## 2025-02-06 ASSESSMENT — ENCOUNTER SYMPTOMS
ARTHRALGIAS: 0
COUGH: 0
HEADACHES: 0
DIARRHEA: 0
WHEEZING: 0
SHORTNESS OF BREATH: 0
DIZZINESS: 0
PALPITATIONS: 0
ABDOMINAL DISTENTION: 0
LIGHT-HEADEDNESS: 0
ABDOMINAL PAIN: 0
WEAKNESS: 0
CONSTIPATION: 0
ACTIVITY CHANGE: 0
MYALGIAS: 1
NUMBNESS: 0
CHEST TIGHTNESS: 0
FATIGUE: 1

## 2025-02-06 NOTE — PATIENT INSTRUCTIONS
We need to start back on the cholesterol-lowering therapy, taking this daily.  Before you run out of your next bottle of pills, please come back in for fasting blood work, these orders have been provided for you.  We will then determine whether dose is appropriate or any adjustments need to be made.  I have also called in a prescription for trazodone.  This can be taken at night before bed to help you sleep.  Please let us know if any persistent or worsening symptoms or any new questions.

## 2025-02-06 NOTE — PROGRESS NOTES
Subjective   Patient ID: Estrella Aguilar is a 69 y.o. female who presents for comprehensive follow up.    Ms. Aguilar comes in today for a comprehensive follow-up.  She was started on a statin after her labs in October.  Unfortunately, she took a 90-day supply, did not have any refills, had not come in for blood work yet, so ran out of this medicine about 2 weeks ago.  She is amenable to returning for fasting blood work but now we need to get her started back on her medications first.  She has had quite a few situational stressors, her father is on comfort care measures only at 95 years old.  Her mother passed away 4 months ago.  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  She is not sleeping well.  Her body just feels tired and achy.  She knows that some of it is related to the stressors and she is wondering if there are medications that may help this.  She has tried some over-the-counter sleep aids which have not been beneficial.  She denies any chest pain or palpitations, shortness of breath nor cough.  She otherwise is feeling reasonably well.        Review of Systems   Constitutional:  Positive for fatigue. Negative for activity change.   Respiratory:  Negative for cough, chest tightness, shortness of breath and wheezing.    Cardiovascular:  Negative for chest pain, palpitations and leg swelling.   Gastrointestinal:  Negative for abdominal distention, abdominal pain, constipation and diarrhea.   Musculoskeletal:  Positive for myalgias. Negative for arthralgias and gait problem.   Neurological:  Negative for dizziness, weakness, light-headedness, numbness and headaches.       Objective   Physical Exam  Constitutional:       Appearance: Normal appearance.   Cardiovascular:      Rate and Rhythm: Normal rate and regular rhythm.      Pulses: Normal pulses.      Heart sounds: No murmur heard.     No gallop.   Pulmonary:      Effort: Pulmonary effort is normal.  No respiratory distress.      Breath sounds: Normal breath sounds. No wheezing, rhonchi or rales.   Abdominal:      General: There is no distension.      Palpations: Abdomen is soft.      Tenderness: There is no abdominal tenderness. There is no guarding.   Musculoskeletal:      Right lower leg: No edema.      Left lower leg: No edema.   Neurological:      Mental Status: She is alert.         Assessment/Plan        1.  Hyperlipidemia: Unfortunately, she stopped taking her medication and when she ran out about 2 weeks ago.  She needs to get started back on this regularly, and then repeat labs in 6 to 8 weeks.  Lab order has been placed and provided to patient.  2.  Situational stressors: Managing reasonably well given the situation.  She is not sleeping well, which will be described below, could consider adding trazodone to see if this is of benefit.  3.  Situational sleep disturbances: Again, consider trazodone as needed.  Cautioned about sedating side effects.    We will make follow-up recommendations once her repeat labs are done after she is back on her medications.  She is to contact us with any questions in the interim.    Suzie Jett MD 02/06/25 12:55 PM

## 2025-02-11 ENCOUNTER — APPOINTMENT (OUTPATIENT)
Dept: ORTHOPEDIC SURGERY | Facility: CLINIC | Age: 70
End: 2025-02-11
Payer: MEDICARE

## 2025-02-12 ENCOUNTER — APPOINTMENT (OUTPATIENT)
Dept: ORTHOPEDIC SURGERY | Facility: CLINIC | Age: 70
End: 2025-02-12
Payer: MEDICARE

## 2025-02-14 ENCOUNTER — APPOINTMENT (OUTPATIENT)
Dept: ORTHOPEDIC SURGERY | Facility: CLINIC | Age: 70
End: 2025-02-14
Payer: MEDICARE

## 2025-04-01 ENCOUNTER — HOSPITAL ENCOUNTER (OUTPATIENT)
Dept: RADIOLOGY | Facility: CLINIC | Age: 70
Discharge: HOME | End: 2025-04-01
Payer: MEDICARE

## 2025-04-01 ENCOUNTER — APPOINTMENT (OUTPATIENT)
Dept: ORTHOPEDIC SURGERY | Facility: CLINIC | Age: 70
End: 2025-04-01
Payer: MEDICARE

## 2025-04-01 DIAGNOSIS — M25.561 RIGHT KNEE PAIN, UNSPECIFIED CHRONICITY: ICD-10-CM

## 2025-04-01 PROCEDURE — 73564 X-RAY EXAM KNEE 4 OR MORE: CPT | Mod: RIGHT SIDE | Performed by: RADIOLOGY

## 2025-04-01 PROCEDURE — 73564 X-RAY EXAM KNEE 4 OR MORE: CPT | Mod: RT

## 2025-04-01 PROCEDURE — 1159F MED LIST DOCD IN RCRD: CPT | Performed by: STUDENT IN AN ORGANIZED HEALTH CARE EDUCATION/TRAINING PROGRAM

## 2025-04-01 PROCEDURE — 99214 OFFICE O/P EST MOD 30 MIN: CPT | Performed by: STUDENT IN AN ORGANIZED HEALTH CARE EDUCATION/TRAINING PROGRAM

## 2025-04-01 PROCEDURE — 1123F ACP DISCUSS/DSCN MKR DOCD: CPT | Performed by: STUDENT IN AN ORGANIZED HEALTH CARE EDUCATION/TRAINING PROGRAM

## 2025-04-01 NOTE — PROGRESS NOTES
PRIMARY CARE PHYSICIAN: Suzie Jett MD  REFERRING PROVIDER: No referring provider defined for this encounter.     SUBJECTIVE  CHIEF COMPLAINT:   Chief Complaint   Patient presents with    Right Knee - New Patient Visit, Pain      HPI: Estrella Aguilar is a pleasant 69 y.o. year-old female who is seen today for evaluation of left knee pain. The pain has been present for one year. The onset of pain was not associated with a traumatic injury.  Estrella Aguilar states that the pain is located throughout her left knee.  While it is painful, it has not slowed down her activity level. She has still be able to participate in high-impact activities including [employer] and running. Estrella Aguilar denies any history of inflammatory arthritis. The patient denies any numbness or tingling of the left lower extremity.    FUNCTIONAL STATUS: not limited.  AMBULATORY STATUS: Independent community ambulation without devices  PREVIOUS TREATMENTS: over the counter medications   HISTORY OF SURGERY ON AFFECTED KNEE(S): No   HIP OR GROIN PAIN REPORTED: Yes   SYMPTOMS INTERFERING WITH SLEEP: No   INSTABILITY: No   IMPACTING QUALITY OF LIFE: Yes     REVIEW OF SYSTEMS  The patient denies any fever, chills, chest pain, shortness of breath or difficulty breathing.  Patient denies any numbness, tingling, or radicular symptoms.  Adult patient history sheet was filled out by the patient today in clinic and will be scanned into the EMR.  I personally reviewed this form which will be scanned into the EMR.  This includes Past Medical History, Past Surgical History, Medications, Allergies, Social History, Family History and 12 point review of systems.    Past Medical History:   Diagnosis Date    Arthritis     Closed displaced fracture of ulnar styloid with routine healing     bilateral    Distal radius fracture     Femoroacetabular impingement of right hip     History of fibula fracture     left    Insomnia 10/07/2024    Proximal humerus fracture     Stress  fracture of femur 10/07/2024    Tympanic membrane perforation, left         Allergies   Allergen Reactions    Amoxicillin-Pot Clavulanate GI Upset     diarrhea    Sulfa (Sulfonamide Antibiotics) Hives        Past Surgical History:   Procedure Laterality Date    BREAST SURGERY  05/06/2014    Breast Surgery Reduction Procedure    CRANIOTOMY      Supratentorial excision of cyst    TOTAL ABDOMINAL HYSTERECTOMY  05/06/2014    Total Abdominal Hysterectomy    TYMPANIC MEMBRANE REPAIR          Family History   Problem Relation Name Age of Onset    Alzheimer's disease Mother Gracie Flores     Colon cancer Mother Gracie Flores     Hypertension Mother Gracie Flores     Coronary artery disease Father      Hypertension Father      Colon cancer Father's Sister Ca collins         Social History     Socioeconomic History    Marital status:      Spouse name: Not on file    Number of children: Not on file    Years of education: Not on file    Highest education level: Not on file   Occupational History    Not on file   Tobacco Use    Smoking status: Former     Types: Cigarettes    Smokeless tobacco: Never   Substance and Sexual Activity    Alcohol use: Not Currently    Drug use: Never    Sexual activity: Yes     Partners: Male     Birth control/protection: None   Other Topics Concern    Not on file   Social History Narrative    Not on file     Social Drivers of Health     Financial Resource Strain: Not on file   Food Insecurity: Not on file   Transportation Needs: Not on file   Physical Activity: Not on file   Stress: Not on file   Social Connections: Not on file   Intimate Partner Violence: Not on file   Housing Stability: Not on file        CURRENT MEDICATIONS:   Current Outpatient Medications   Medication Sig Dispense Refill    rosuvastatin (Crestor) 20 mg tablet Take 1 tablet (20 mg) by mouth once daily. 90 tablet 0    traZODone (Desyrel) 50 mg tablet Take 1 tablet (50 mg) by mouth as needed at bedtime for  sleep. 30 tablet 2     No current facility-administered medications for this visit.        OBJECTIVE    PHYSICAL EXAM  There is no height or weight on file to calculate BMI.    General: Well-appearing female in no acute distress.  Awake, alert and oriented.  Pleasant and cooperative.  Respiratory: Non-labored breathing  Mood: Euthymic   Gait: Normal  Assistive Device: None     Affected Left Knee  Limb Alignment: Normal  ROM: 0-130  Stable to varus and valgus stress at full extension and 30 degrees of flexion  Skin: Intact, no abrasions or draining sinuses  Effusion: None  Quad Strength: 5/5  Hamstring Strength: 5/5  Patella Crepitus: None  Patella Grind: No  Tenderness: None  Sensation: Intact to light touch distally  Motor function: Able to fire TA, EHL, G/S  Pulses: Palpable DP pulse    Unaffected Right Knee  Skin: Intact  ROM: 0-120  Effusion: None  No tenderness to palpation on exam    IMAGING:  AP / lateral/ mid-flexion/sunrise views: Independent review of left knee x-rays was performed. The findings were reviewed with the patient. There are mild degenerative changes of the left knee with neutral  limb alignment. There is not joint space narrowing, subchondral sclerosis, and osteophyte formation. No evidence of fracture, AVN, dislocation, osteomyelitis.      ASSESSMENT & PLAN    IMPRESSION:  Estrella Aguilar is a 69 y.o. female with mild/moderate OA of the left knee. Based on her exam and imaging findings, the pain is her right knee is most likely referred pain from her moderate hip osteoarthritis. We discussed in detail a treatment plan that she is comfortable with.    PLAN:  - She may continue to manage her pain symptomatically with NSAIDs as needed  - Continue participating in physical therapy and home strengthening exercises   - Should her pain progress, we may offer a CSI for her right hip    All of the patient's questions were answered and she was comfortable with this plan of care.  Estrella Aguilar was  encouraged to call if any problems, questions or concerns arise.     She will follow up in one year with repeat radiographs of the right hip.    Nura Waters MD  Orthopaedic Surgery, PGY-2

## 2025-04-03 LAB
ALBUMIN SERPL-MCNC: 3.9 G/DL (ref 3.6–5.1)
ALP SERPL-CCNC: 67 U/L (ref 37–153)
ALT SERPL-CCNC: 19 U/L (ref 6–29)
ANION GAP SERPL CALCULATED.4IONS-SCNC: 9 MMOL/L (CALC) (ref 7–17)
AST SERPL-CCNC: 15 U/L (ref 10–35)
BILIRUB SERPL-MCNC: 0.4 MG/DL (ref 0.2–1.2)
BUN SERPL-MCNC: 21 MG/DL (ref 7–25)
CALCIUM SERPL-MCNC: 9 MG/DL (ref 8.6–10.4)
CHLORIDE SERPL-SCNC: 109 MMOL/L (ref 98–110)
CHOLEST SERPL-MCNC: 173 MG/DL
CHOLEST/HDLC SERPL: 2.7 (CALC)
CK SERPL-CCNC: 48 U/L (ref 20–243)
CO2 SERPL-SCNC: 24 MMOL/L (ref 20–32)
CREAT SERPL-MCNC: 0.9 MG/DL (ref 0.5–1.05)
EGFRCR SERPLBLD CKD-EPI 2021: 69 ML/MIN/1.73M2
GLUCOSE SERPL-MCNC: 98 MG/DL (ref 65–99)
HDLC SERPL-MCNC: 64 MG/DL
LDLC SERPL CALC-MCNC: 90 MG/DL (CALC)
NONHDLC SERPL-MCNC: 109 MG/DL (CALC)
POTASSIUM SERPL-SCNC: 4.6 MMOL/L (ref 3.5–5.3)
PROT SERPL-MCNC: 6.4 G/DL (ref 6.1–8.1)
SODIUM SERPL-SCNC: 142 MMOL/L (ref 135–146)
TRIGL SERPL-MCNC: 99 MG/DL

## 2025-04-18 ENCOUNTER — HOSPITAL ENCOUNTER (OUTPATIENT)
Dept: RADIOLOGY | Facility: CLINIC | Age: 70
Discharge: HOME | End: 2025-04-18
Payer: MEDICARE

## 2025-04-18 DIAGNOSIS — E78.2 MIXED HYPERLIPIDEMIA: ICD-10-CM

## 2025-04-18 PROCEDURE — 75571 CT HRT W/O DYE W/CA TEST: CPT

## 2025-04-28 ENCOUNTER — OFFICE VISIT (OUTPATIENT)
Dept: ORTHOPEDIC SURGERY | Facility: CLINIC | Age: 70
End: 2025-04-28
Payer: MEDICARE

## 2025-04-28 ENCOUNTER — HOSPITAL ENCOUNTER (OUTPATIENT)
Dept: RADIOLOGY | Facility: CLINIC | Age: 70
Discharge: HOME | End: 2025-04-28
Payer: MEDICARE

## 2025-04-28 DIAGNOSIS — M54.16 LUMBAR RADICULOPATHY: ICD-10-CM

## 2025-04-28 DIAGNOSIS — M79.673 FOOT AND ANKLE PAIN: ICD-10-CM

## 2025-04-28 DIAGNOSIS — M25.579 FOOT AND ANKLE PAIN: ICD-10-CM

## 2025-04-28 PROCEDURE — 73630 X-RAY EXAM OF FOOT: CPT | Mod: 50

## 2025-04-28 PROCEDURE — 99214 OFFICE O/P EST MOD 30 MIN: CPT | Performed by: ORTHOPAEDIC SURGERY

## 2025-04-28 PROCEDURE — 73630 X-RAY EXAM OF FOOT: CPT | Mod: BILATERAL PROCEDURE | Performed by: RADIOLOGY

## 2025-04-28 PROCEDURE — 73610 X-RAY EXAM OF ANKLE: CPT | Mod: BILATERAL PROCEDURE | Performed by: RADIOLOGY

## 2025-04-28 PROCEDURE — 73610 X-RAY EXAM OF ANKLE: CPT | Mod: 50

## 2025-04-28 NOTE — PROGRESS NOTES
"Estrella Aguilar    CHIEF COMPLAINT:  Chief Complaint   Patient presents with    Left Foot - Pain     NPV BL FOOT/ANKLE SWELLING/N/T/PAIN X \"A LONG TIME\" NKI    Right Foot - Pain         HISTORY OF PRESENT ILLNESS:  This is a 70 y.o. female who presents today with bilateral foot pain and swelling.   Notes numbness in her dorsum of hte foot, then underneath. Also with pain in the forefoot.     Pain is worse after sitting a long time. Has not tried anything.     Occupation: Nanny 3x per week  Nicotine (Smoking/Vaping) History: non-smoker  Personal or Family Hx of DVT/PE: No  Metal Allergy: No  Diabetic:   No  Last Hgba1c:   Lab Results   Component Value Date    HGBA1C 5.5 10/07/2024       Assessment/Plan:  1. Foot and ankle pain  - BL foot and ankle XR ordered and reviwed  - XR foot 3+ views bilateral; Future  - XR ankle bilateral complete minimum 3 views; Future    2. Lumbar radiculopathy  - Referral to Spine Surgery; Future    Discussed that her x-rays and exam do not show any mechanical reason for her foot pain/swelling and numbness. She has a history of cerival radiculopathy, so will refer to spine for evaluation       Thank you for the opportunity to participate in this patient's care.    Physical Exam:  Well appearing female in no acute distress; Alert and oriented.  Bilateral  Lower Extremity:  Gait Cycle:  Normal  Inspection:  Swelling: No  Redness: No  .  Ecchymosis: No  Effusion: No    Alignment: no angular deformity  Pain on palpation:  None  ROM: Ankle- Normal.  Strength: normal  Stability:  is stable  Neurologic Status:  Sensation to all 4 compartments of lower extremity are grossly intact to light touch today in the office.  Vascular Status:   Tibialis posterior pulse: present  Dorsalis pedis pulse: present  Skin:  Normal        IMAGING:     Imaging was ordered today. Final results and radiologist's interpretation, available in the [facility] health record. Images were reviewed with the patient/family members in " the office today. My personal interpretation of the performed imaging is no abnormality noted    Nicky Blount MD

## 2025-05-07 DIAGNOSIS — M54.16 LUMBAR RADICULOPATHY: ICD-10-CM

## 2025-05-07 DIAGNOSIS — E78.2 MIXED HYPERLIPIDEMIA: Primary | ICD-10-CM

## 2025-05-07 RX ORDER — ROSUVASTATIN CALCIUM 20 MG/1
20 TABLET, COATED ORAL DAILY
Qty: 90 TABLET | Refills: 2 | Status: SHIPPED | OUTPATIENT
Start: 2025-05-07 | End: 2025-08-05

## 2025-05-08 ENCOUNTER — OFFICE VISIT (OUTPATIENT)
Dept: ORTHOPEDIC SURGERY | Facility: CLINIC | Age: 70
End: 2025-05-08
Payer: MEDICARE

## 2025-05-08 ENCOUNTER — HOSPITAL ENCOUNTER (OUTPATIENT)
Dept: RADIOLOGY | Facility: CLINIC | Age: 70
Discharge: HOME | End: 2025-05-08
Payer: MEDICARE

## 2025-05-08 DIAGNOSIS — M54.16 LUMBAR RADICULOPATHY: ICD-10-CM

## 2025-05-08 DIAGNOSIS — M48.061 DEGENERATIVE LUMBAR SPINAL STENOSIS: ICD-10-CM

## 2025-05-08 PROCEDURE — 72100 X-RAY EXAM L-S SPINE 2/3 VWS: CPT

## 2025-05-08 PROCEDURE — 72100 X-RAY EXAM L-S SPINE 2/3 VWS: CPT | Performed by: STUDENT IN AN ORGANIZED HEALTH CARE EDUCATION/TRAINING PROGRAM

## 2025-05-08 PROCEDURE — 1159F MED LIST DOCD IN RCRD: CPT | Performed by: STUDENT IN AN ORGANIZED HEALTH CARE EDUCATION/TRAINING PROGRAM

## 2025-05-08 PROCEDURE — 99213 OFFICE O/P EST LOW 20 MIN: CPT | Performed by: STUDENT IN AN ORGANIZED HEALTH CARE EDUCATION/TRAINING PROGRAM

## 2025-05-08 NOTE — PROGRESS NOTES
Orthopaedic Spine Surgery Clinic Note    Patient ID: Estrella Aguilar is a 70 y.o. female.    Chief Complaint  Chief Complaint   Patient presents with    Lower Back - Pain       Referral from Dr. Blount.     History of Present Illness  Ms. Aguilar is a 70-year-old female who presents to the office for initial evaluation of chronic low back pain.  Patient relates a history of chronic pain in her lower back that has gradually worsened over the past few years. She experiences pain in a bandlike distribution across the low back mostly upon waking in the morning that improves with stretching and walking her dog in the morning. She denies radiating numbness or paresthesias in either legs, but reports numbness in her bilateral feet occasionally.  She does relate some right-sided leg symptoms which she had evaluated as possible hip pathology, however she was told she had preserved right hip joint.  Patient endorses full control of her bowel and bladder. She has been very active her whole life, and still continues to walk daily, but has been out of her routine recently while traveling to care for her parents who have recently passed. She takes Aleve prn for the pain with improvement in symptoms. She has not had any PT for her back. She deals with right hip and knee pain, for which she is seeing Dr. Brunson.      Relevant PMH/PSH for spine  C4-7 Laminectomy, C3-6 posterior spinal fusion ~10 years ago, myelopathy    Medical History[1]    Surgical History[2]    Social History[3]    Family History[4]    Allergies[5]    Current Outpatient Medications   Medication Instructions    rosuvastatin (CRESTOR) 20 mg, oral, Daily    traZODone (DESYREL) 50 mg, oral, Nightly PRN         Vitals & Measurements  There were no vitals filed for this visit.     Ortho Exam  General: AO x 3, NAD  Cardio: examined extremities perfused by peripheral palpation  Resp: breathing unlabored  Gait: within normal limits, non-antalgic    Lower  Extremity  Right  Left  IP   5/5  5/5  Quadriceps  5/5  5/5  Tibialis anterior  5/5  5/5  EHL   5/5  5/5  Gastrocnemius  5/5  5/5    Sensation: Normal to light touch throughout lower extremities bilaterally.    Reflexes:  Right   Left  Q  2+  2+  A   2+   2+    Clonus: negative  Straight leg raise: negative      Diagnostic Results - Imaging    XR lumbar spine today, 5/8/2025  Official read pending.  New upright AP and lateral radiographs of the lumbar spine were obtained in the office today.  These images are of good quality.  Demonstrate on coronal view is a mild degenerative scoliosis with apex right.  On sagittal profile there is a hyperlordotic lumbar spine.  There is multilevel disc degeneration with almost complete collapse throughout the lumbar spine.  There is no significant spondylolisthesis.  There is multilevel facet arthropathy.  Diffuse osteopenia is present.  There is no acute fracture or dislocation.        Diagnosis  Encounter Diagnoses   Name Primary?    Lumbar radiculopathy     Degenerative lumbar spinal stenosis           Assessment/Plan  Ms. Aguilar is a pleasant 70-year-old female who presents for initial evaluation of acute on chronic low back pain with bilateral lower extremity numbness/paresthesias in addition to primarily right sided leg pain.  Patient relates a chronic history of low back pain that has gradually worsened over the past few years.  She relates numbness in bilateral feet along with right sided leg pain.  Unclear if this is radicular in nature or related to her hip which she had evaluated recently with Dr. Brunson.  She has not had any formal treatment for her lower back.  We did review her XR imaging today in the office which demonstrates chronic degenerative changes with multilevel spondylosis and a mild degenerative scoliosis.  Patient does have a history of a posterior spinal decompression and instrumented fusion for what sounds like myelopathy.    I had an extensive  discussion in the office today with the patient with regards to her symptoms, her imaging findings, and possible management options.  We discussed how her symptoms may relate to lumbar stenosis which can cause bilateral lower extremity numbness/paresthesias in addition to lower extremity radiculopathy.  She has not had any formal treatment for her lower back.  I recommended a referral to physical therapy for a low back and lower extremity stretching and strengthening program.  Patient will continue taking over-the-counter medication as needed for her pains.  I did provide my business card with office contact information for her to reach out if her symptoms fail to improve or worsen with physical therapy, at which point we can consider an MRI of the lumbar spine.    After our discussion, the patient articulated understanding of the plan and felt that all questions had been answered satisfactorily. The patient was pleased with the visit and very appreciative for the care rendered.    **Please excuse any errors in grammar or translation related to this dictation. Voice recognition software was utilized to prepare this document. **    F/u PRN.       [facility]  Orthopedic Spine Surgery       I reviewed the PA's documentation and discussed the patient with the PA. I agree with the PA's medical decision making as documented in the note.       --    Blaze Wilkerson MD  Orthopaedic Spine Surgery  , Department of Orthopaedic Surgery  [facility]         [1]   Past Medical History:  Diagnosis Date    Arthritis     Closed displaced fracture of ulnar styloid with routine healing     bilateral    Distal radius fracture     Femoroacetabular impingement of right hip     History of fibula fracture     left    Insomnia 10/07/2024    Proximal humerus fracture     Stress fracture of femur 10/07/2024    Tympanic membrane perforation, left    [2]   Past Surgical  History:  Procedure Laterality Date    BREAST SURGERY  05/06/2014    Breast Surgery Reduction Procedure    CRANIOTOMY      Supratentorial excision of cyst    TOTAL ABDOMINAL HYSTERECTOMY  05/06/2014    Total Abdominal Hysterectomy    TYMPANIC MEMBRANE REPAIR     [3]   Social History  Socioeconomic History    Marital status:    Tobacco Use    Smoking status: Former     Types: Cigarettes    Smokeless tobacco: Never   Substance and Sexual Activity    Alcohol use: Not Currently    Drug use: Never    Sexual activity: Yes     Partners: Male     Birth control/protection: None   [4]   Family History  Problem Relation Name Age of Onset    Alzheimer's disease Mother Gracie Flores     Colon cancer Mother Gracie Flores     Hypertension Mother Gracie Flores     Coronary artery disease Father      Hypertension Father      Colon cancer Father's Sister Ca collins    [5]   Allergies  Allergen Reactions    Amoxicillin-Pot Clavulanate GI Upset     diarrhea    Sulfa (Sulfonamide Antibiotics) Hives

## 2025-05-22 ENCOUNTER — APPOINTMENT (OUTPATIENT)
Dept: AUDIOLOGY | Facility: CLINIC | Age: 70
End: 2025-05-22
Payer: MEDICARE

## 2025-05-22 ENCOUNTER — APPOINTMENT (OUTPATIENT)
Dept: OTOLARYNGOLOGY | Facility: CLINIC | Age: 70
End: 2025-05-22
Payer: MEDICARE

## 2025-06-03 NOTE — PROGRESS NOTES
Physical Therapy  Physical Therapy Orthopedic Evaluation    Patient Name: Estrella Aguilar  MRN: [id number]  Today's Date: 6/5/2025  Time Calculation  Start Time: 1217  Stop Time: 1256  Time Calculation (min): 39 min    Insurance:  Visit number: 1 of [place]  Authorization info: no auth   Insurance Type: [employer]     General:  Reason for visit: chronic LB pain   Referred by: Blaze Wilkerson     Current Problem:  1. Chronic pain of right knee  Follow Up In Physical Therapy      2. Degenerative lumbar spinal stenosis  Referral to Physical Therapy    Follow Up In Physical Therapy      3. Weakness of both lower extremities            Precautions: None   Precautions  STEADI Fall Risk Score (The score of 4 or more indicates an increased risk of falling): 0      Medical History Form: Reviewed (scanned into chart)    Subjective:   70 y.o. female presents to the clinic with complaints of chronic LB pain & R knee pain.  Back pain is worse in the morning but improves with walking/stretching.  Avid runner, who reports she has been unable to run as of late.  Has a ten year history of knee buckling on her L side.  Currently is having R sided knee pain due to compensations in gait mechanics. Trying to walk more with the weather being nicer, feels her R leg is dragging by the end of her walks (~30 mins). Uneven surfaces makes her nervous, has considered using a cane. Unsteadiness is a bigger limitation vs pain. Also notes L sided numbness in her toes.      PMH:   C4-7 Laminectomy, C3-6 posterior spinal fusion ~10 years ago, myelopathy   L knee OA  Current Condition:   Same    Pain:8/10  Pain Assessment: 0-10  0-10 (Numeric) Pain Score: 4  Pain Type: Chronic pain  Pain Location: Knee  Pain Orientation: Right  Pain Descriptors: Aching  Location: R knee   Description: aching, sore   Aggravating Factors: walking & prolonged standing   Relieving Factors:  rest    Relevant Information (PMH & Previous Tests/Imaging): x-ray  -multilevel  DDD    Previous Interventions/Treatments: None    Prior Level of Function (PLOF)  Patient previously independent with all ADLs  Exercise/Physical Activity: running & [employer]   Work/School:      Patients Living Environment: Reviewed and no concern    Primary Language: English    There are no spiritual/cultural practices/values/needs that are important to know    Patient's Goal(s) for Therapy: improve her mobility and ability to walk further    Red Flags: Do you have any of the following? Partially  Fever/chills, unexplained weight changes, dizziness/fainting, unexplained change in bowel or bladder functions, unexplained malaise or muscle weakness, night pain/sweats, numbness or tingling  L numbness in toes     Objective:  Objective       ROM    Lumbar AROM (%)    Grossly WNL      Hip AROM (Degrees)      (R)  (L)  Flexion: WNL  WNL   Extension: WNL  WNL    Abduction: WNL  WNL     ER:  63  54     IR:  25  10           Strength Testing    Hip    (R)  (L)  Flexion: 3  5     Extension: 4  4    Abduction: 3  4     ER:  5  5    IR:  5  5      Knees    (R)  (L)  Flexion: 3  5     Extension: 5  5         Palpation: Tenderness to palpation at lateral R knee     Gait: upright posture, no deviations noted         Functional Screening    Squat: good hip hinge, increased trunk flexion           Special Tests  Radicular Symptoms: -      Outcome Measures:  Other Measures  Oswestry Disablity Index (LARRY): 0     Will complete LEFS in subsequent visits.       EDUCATION: Home exercise program, plan of care, activity modifications, pain management, and injury pathology       Goals: Set and discussed today  Active       PT Problem       PT Goal 1       Start:  06/05/25    Expected End:  07/03/25       Short Term Goals:  1) Patient will report completion of HEP demonstrating independence with exercise within 1 week.  2) Patient will demonstrate a 4/5 MMT grades demonstrating improvements in LE strength within 4 weeks.  3) Patient  will improve hip ER ROM to 55 degrees for improvements in ROM and mobility within 4 weeks.  4) Patient will improve hip IR ROM to 50 degrees for improvements in ROM and mobility within 4 weeks.  5) Patient will report no more than a 4/10 pain demonstrating improvements in activity tolerance and symptom management within 4 weeks.         PT Goal 2       Start:  06/05/25    Expected End:  07/31/25       Long Term Goals:  1) Patient will report a LEFS of 60 demonstrating improvements in ADL participation and function within 8 weeks.   2) Patient will demonstrate a 5/5 MMT grade demonstrating improvements in LE strength within 8 weeks.   3) Patient will report no more than a 1/10 pain demonstrating improvements in activity tolerance and symptom management within 8 weeks.  4) Patient will report the ability to walk for ~ 1 hour w/o exacerbations in pain demonstrating improvements in her symptom management and functional capacity within 8 weeks.              Plan of care was developed with input and agreement by the patient      Treatment Performed:      Therapeutic Exercise:    24 min  Access Code: [email or web address]  URL: [email or web address]  Date: 06/05/2025  Prepared by: Ruth Denson    Exercises  - Seated Piriformis Stretch  - 2 x daily - 7 x weekly - 2 sets - 10 reps - 30 sec hold  - Seated Piriformis Stretch  - 2 x daily - 7 x weekly - 2 sets - 10 reps - 30 sec hold  - Figure 4 Bridge  - 1 x daily - 7 x weekly - 2 sets - 10 reps  - Hip Abduction with Resistance Loop  - 1 x daily - 7 x weekly - 2 sets - 10 reps    Units:  Eval low  TEx2    PT Evaluation Time Entry  PT Evaluation (Low) Time Entry: 15  PT Therapeutic Procedures Time Entry  Therapeutic Exercise Time Entry: 24                      Assessment:   Estrella JOHN Aguilar presents to the clinic with S/S consistent with gross lower body weakness and disuse.  R knee pain appears to be due to weakness/tightness of her glutes/piriformis.  Above problems  have resulted in limitations primarily in their ADLs pain free & inability to perform at their PLOF.  Most notable limitations include their ability to her ability to walk for >30 minutes & experiencing chronic pain.  Today's finding found deficits in LE strength and hip mobility.  Future sessions will focus on interventions including education on home care management, functional training, LE strengthening, & stretching to address said deficits.  The skills of a physical therapist are required to address the deficits found during today's evaluation, while progressing them to a full return to their PLOF.  Patient demonstrates excellent rehab potential because of her current clinical presentation and good overall health.               Clinical Presentation: Stable and/or uncomplicated characteristics    Plan:     Planned Interventions include: therapeutic exercise, self-care home management, manual therapy, therapeutic activities, gait training, neuromuscular coordination, vasopneumatic, dry needling, aquatic therapy  Frequency: 1 x Week  Duration: 8 Weeks  Rehab Potential/Prognosis: Excellent      Ruth Denson, PT

## 2025-06-05 ENCOUNTER — EVALUATION (OUTPATIENT)
Dept: PHYSICAL THERAPY | Facility: CLINIC | Age: 70
End: 2025-06-05
Payer: MEDICARE

## 2025-06-05 DIAGNOSIS — M25.561 CHRONIC PAIN OF RIGHT KNEE: Primary | ICD-10-CM

## 2025-06-05 DIAGNOSIS — M48.061 DEGENERATIVE LUMBAR SPINAL STENOSIS: ICD-10-CM

## 2025-06-05 DIAGNOSIS — R29.898 WEAKNESS OF BOTH LOWER EXTREMITIES: ICD-10-CM

## 2025-06-05 DIAGNOSIS — G89.29 CHRONIC PAIN OF RIGHT KNEE: Primary | ICD-10-CM

## 2025-06-05 PROCEDURE — 97161 PT EVAL LOW COMPLEX 20 MIN: CPT | Mod: GP

## 2025-06-05 PROCEDURE — 97110 THERAPEUTIC EXERCISES: CPT | Mod: GP

## 2025-06-05 ASSESSMENT — PAIN DESCRIPTION - DESCRIPTORS: DESCRIPTORS: ACHING

## 2025-06-05 ASSESSMENT — PAIN SCALES - GENERAL: PAINLEVEL_OUTOF10: 4

## 2025-06-05 ASSESSMENT — PAIN - FUNCTIONAL ASSESSMENT: PAIN_FUNCTIONAL_ASSESSMENT: 0-10

## 2025-06-12 ENCOUNTER — DOCUMENTATION (OUTPATIENT)
Dept: PHYSICAL THERAPY | Facility: CLINIC | Age: 70
End: 2025-06-12
Payer: MEDICARE

## 2025-06-12 DIAGNOSIS — G89.29 CHRONIC PAIN OF RIGHT KNEE: Primary | ICD-10-CM

## 2025-06-12 DIAGNOSIS — M25.561 CHRONIC PAIN OF RIGHT KNEE: Primary | ICD-10-CM

## 2025-06-12 DIAGNOSIS — R29.898 WEAKNESS OF BOTH LOWER EXTREMITIES: ICD-10-CM

## 2025-06-12 DIAGNOSIS — M48.061 DEGENERATIVE LUMBAR SPINAL STENOSIS: ICD-10-CM

## 2025-06-12 NOTE — PROGRESS NOTES
Physical Therapy                 Therapy Communication Note    Patient Name: Estrella Aguilar  MRN: [id number]  Department:   Room: Room/bed info not found  Today's Date: 6/12/2025     Discipline: Physical Therapy    Missed Visit:       Missed Visit Reason:  none noted    Missed Time: No Show    Comment: phoned Estrella @ 9:00 and left a VM.

## 2025-06-19 ENCOUNTER — APPOINTMENT (OUTPATIENT)
Dept: PHYSICAL THERAPY | Facility: CLINIC | Age: 70
End: 2025-06-19
Payer: MEDICARE

## 2025-06-19 DIAGNOSIS — M25.561 CHRONIC PAIN OF RIGHT KNEE: Primary | ICD-10-CM

## 2025-06-19 DIAGNOSIS — M48.061 DEGENERATIVE LUMBAR SPINAL STENOSIS: ICD-10-CM

## 2025-06-19 DIAGNOSIS — G89.29 CHRONIC PAIN OF RIGHT KNEE: Primary | ICD-10-CM

## 2025-06-19 DIAGNOSIS — R29.898 WEAKNESS OF BOTH LOWER EXTREMITIES: ICD-10-CM

## 2025-06-26 ENCOUNTER — APPOINTMENT (OUTPATIENT)
Dept: PHYSICAL THERAPY | Facility: CLINIC | Age: 70
End: 2025-06-26
Payer: MEDICARE

## 2025-07-03 ENCOUNTER — APPOINTMENT (OUTPATIENT)
Dept: PHYSICAL THERAPY | Facility: CLINIC | Age: 70
End: 2025-07-03
Payer: MEDICARE

## 2025-07-11 ENCOUNTER — APPOINTMENT (OUTPATIENT)
Dept: PHYSICAL THERAPY | Facility: CLINIC | Age: 70
End: 2025-07-11
Payer: MEDICARE

## 2025-07-17 DIAGNOSIS — E66.3 OVERWEIGHT (BMI 25.0-29.9): Primary | ICD-10-CM

## 2025-08-08 ENCOUNTER — APPOINTMENT (OUTPATIENT)
Dept: ORTHOPEDIC SURGERY | Facility: CLINIC | Age: 70
End: 2025-08-08
Payer: MEDICARE

## 2025-08-17 ASSESSMENT — ENCOUNTER SYMPTOMS
VOMITING: 0
SORE THROAT: 0
DIARRHEA: 0
COUGH: 0
ABDOMINAL PAIN: 0
RHINORRHEA: 0
HEADACHES: 0
NECK PAIN: 0

## 2025-08-21 ENCOUNTER — CLINICAL SUPPORT (OUTPATIENT)
Dept: AUDIOLOGY | Facility: CLINIC | Age: 70
End: 2025-08-21
Payer: MEDICARE

## 2025-08-21 ENCOUNTER — APPOINTMENT (OUTPATIENT)
Dept: OTOLARYNGOLOGY | Facility: CLINIC | Age: 70
End: 2025-08-21
Payer: MEDICARE

## 2025-08-21 VITALS — TEMPERATURE: 98.2 F | BODY MASS INDEX: 28.58 KG/M2 | HEIGHT: 62 IN | WEIGHT: 155.3 LBS

## 2025-08-21 DIAGNOSIS — H90.A21 SENSORINEURAL HEARING LOSS (SNHL) OF RIGHT EAR WITH RESTRICTED HEARING OF LEFT EAR: Primary | ICD-10-CM

## 2025-08-21 DIAGNOSIS — H72.92 PERFORATION OF LEFT TYMPANIC MEMBRANE: ICD-10-CM

## 2025-08-21 DIAGNOSIS — H90.A32 MIXED CONDUCTIVE AND SENSORINEURAL HEARING LOSS OF LEFT EAR WITH RESTRICTED HEARING OF RIGHT EAR: ICD-10-CM

## 2025-08-21 PROCEDURE — 99213 OFFICE O/P EST LOW 20 MIN: CPT | Performed by: NURSE PRACTITIONER

## 2025-08-21 PROCEDURE — 92567 TYMPANOMETRY: CPT | Performed by: AUDIOLOGIST

## 2025-08-21 PROCEDURE — 1159F MED LIST DOCD IN RCRD: CPT | Performed by: NURSE PRACTITIONER

## 2025-08-21 PROCEDURE — 92557 COMPREHENSIVE HEARING TEST: CPT | Performed by: AUDIOLOGIST

## 2025-08-21 PROCEDURE — 1126F AMNT PAIN NOTED NONE PRSNT: CPT | Performed by: NURSE PRACTITIONER

## 2025-08-21 PROCEDURE — 1160F RVW MEDS BY RX/DR IN RCRD: CPT | Performed by: NURSE PRACTITIONER

## 2025-08-21 PROCEDURE — 3008F BODY MASS INDEX DOCD: CPT | Performed by: NURSE PRACTITIONER

## 2025-08-21 ASSESSMENT — PAIN SCALES - GENERAL: PAINLEVEL_OUTOF10: 0-NO PAIN

## 2025-08-21 ASSESSMENT — PATIENT HEALTH QUESTIONNAIRE - PHQ9
1. LITTLE INTEREST OR PLEASURE IN DOING THINGS: NOT AT ALL
SUM OF ALL RESPONSES TO PHQ9 QUESTIONS 1 & 2: 0
2. FEELING DOWN, DEPRESSED OR HOPELESS: NOT AT ALL

## 2025-09-08 ENCOUNTER — APPOINTMENT (OUTPATIENT)
Dept: RADIOLOGY | Facility: CLINIC | Age: 70
End: 2025-09-08
Payer: MEDICARE

## 2025-09-09 ENCOUNTER — APPOINTMENT (OUTPATIENT)
Dept: ORTHOPEDIC SURGERY | Facility: CLINIC | Age: 70
End: 2025-09-09
Payer: MEDICARE

## 2025-09-29 ENCOUNTER — APPOINTMENT (OUTPATIENT)
Dept: OTOLARYNGOLOGY | Facility: CLINIC | Age: 70
End: 2025-09-29
Payer: MEDICARE

## 2025-10-07 ENCOUNTER — APPOINTMENT (OUTPATIENT)
Dept: PRIMARY CARE | Facility: CLINIC | Age: 70
End: 2025-10-07
Payer: MEDICARE

## 2025-10-10 ENCOUNTER — APPOINTMENT (OUTPATIENT)
Dept: PRIMARY CARE | Facility: CLINIC | Age: 70
End: 2025-10-10
Payer: MEDICARE

## 2026-08-21 ENCOUNTER — APPOINTMENT (OUTPATIENT)
Dept: OTOLARYNGOLOGY | Facility: CLINIC | Age: 71
End: 2026-08-21
Payer: MEDICARE